# Patient Record
Sex: FEMALE | Race: WHITE | Employment: FULL TIME | URBAN - METROPOLITAN AREA
[De-identification: names, ages, dates, MRNs, and addresses within clinical notes are randomized per-mention and may not be internally consistent; named-entity substitution may affect disease eponyms.]

---

## 2017-03-19 ENCOUNTER — HOSPITAL ENCOUNTER (OUTPATIENT)
Age: 22
Discharge: HOME | End: 2017-03-19
Payer: COMMERCIAL

## 2017-03-19 VITALS
HEART RATE: 101 BPM | BODY MASS INDEX: 21.82 KG/M2 | HEIGHT: 68 IN | OXYGEN SATURATION: 100 % | RESPIRATION RATE: 12 BRPM | DIASTOLIC BLOOD PRESSURE: 85 MMHG | SYSTOLIC BLOOD PRESSURE: 114 MMHG | WEIGHT: 144 LBS | TEMPERATURE: 99 F

## 2017-03-19 DIAGNOSIS — H61.21 IMPACTED CERUMEN OF RIGHT EAR: ICD-10-CM

## 2017-03-19 DIAGNOSIS — H65.91 RIGHT NON-SUPPURATIVE OTITIS MEDIA: Primary | ICD-10-CM

## 2017-03-19 LAB — S PYO AG THROAT QL: NEGATIVE

## 2017-03-19 PROCEDURE — 99214 OFFICE O/P EST MOD 30 MIN: CPT

## 2017-03-19 PROCEDURE — 87430 STREP A AG IA: CPT

## 2017-03-19 PROCEDURE — 99213 OFFICE O/P EST LOW 20 MIN: CPT

## 2017-03-19 RX ORDER — AMOXICILLIN 875 MG/1
875 TABLET, COATED ORAL 2 TIMES DAILY
Qty: 20 TABLET | Refills: 0 | Status: SHIPPED | OUTPATIENT
Start: 2017-03-19 | End: 2017-03-29

## 2017-03-19 RX ORDER — NORGESTIMATE AND ETHINYL ESTRADIOL 0.25-0.035
KIT ORAL
COMMUNITY
End: 2017-03-19

## 2017-03-19 NOTE — ED INITIAL ASSESSMENT (HPI)
Sore throat for 6 days. Woke up this morning with severe right ear pain. No fever. Mild congestion and cough. No N/V/D. Muffled hearing from right ear.

## 2017-03-19 NOTE — ED PROVIDER NOTES
Patient presents with:  Sore Throat  Ear Pain      HPI:     Milagros Coronado is a 22year old female who presents for evaluation and management of a chief complaint of cough without respiratory distress, right ear pain, and sore throat for the past 3-4 days. adenopathy  CARDIO: RRR without murmur  EXTREMITIES: no cyanosis, edema, VELASQUEZ without difficulty  HEAD: normocephalic  EYES: sclera non icteric bilateral, conjunctiva clear  EARS: TM  left: normal and cerumen impacted  right  NOSE: nasal turbinates: swollen

## 2017-07-01 ENCOUNTER — HOSPITAL ENCOUNTER (OUTPATIENT)
Age: 22
Discharge: HOME | End: 2017-07-01
Attending: EMERGENCY MEDICINE
Payer: COMMERCIAL

## 2017-07-01 VITALS
TEMPERATURE: 99 F | OXYGEN SATURATION: 100 % | HEIGHT: 68 IN | WEIGHT: 145 LBS | SYSTOLIC BLOOD PRESSURE: 115 MMHG | HEART RATE: 102 BPM | RESPIRATION RATE: 20 BRPM | DIASTOLIC BLOOD PRESSURE: 71 MMHG | BODY MASS INDEX: 21.98 KG/M2

## 2017-07-01 DIAGNOSIS — R30.0 DYSURIA: Primary | ICD-10-CM

## 2017-07-01 LAB
B-HCG UR QL: NEGATIVE
URINE BILIRUBIN: NEGATIVE
URINE BLOOD: NEGATIVE
URINE CLARITY: CLEAR
URINE COLOR: YELLOW
URINE GLUCOSE: NEGATIVE MG/DL
URINE KETONES: NEGATIVE MG/DL
URINE LEUKOCYTE ESTERASE: NEGATIVE
URINE NITRITE: NEGATIVE
URINE PH: 7.5
URINE PROTEIN: NEGATIVE MG /DL
URINE SPECIFIC GRAVITY: 1.02
URINE UROBILINOGEN: 0.2 MG/DL

## 2017-07-01 PROCEDURE — 99212 OFFICE O/P EST SF 10 MIN: CPT

## 2017-07-01 PROCEDURE — 81025 URINE PREGNANCY TEST: CPT

## 2017-07-01 PROCEDURE — 81002 URINALYSIS NONAUTO W/O SCOPE: CPT

## 2017-07-01 NOTE — ED PROVIDER NOTES
Patient presents with:  Urinary Symptoms (urologic)      HPI:     Polo Bucio is a 22year old female who presents with a chief complaint of burning and frequency of urination. She has had rare past episodes of UTI. Onset of symptoms was today.    Patient symptoms      Orders Placed This Encounter      POC Urinalysis Dipstick Once      POCT Pregnancy, Urine Once      POCT Pregnancy, Urine    Labs performed this visit:    Recent Results (from the past 10 hour(s))  -[facility] POCT PREGNANCY URINE   Collection Time:

## 2017-07-04 ENCOUNTER — HOSPITAL ENCOUNTER (OUTPATIENT)
Age: 22
Discharge: HOME | End: 2017-07-04
Attending: EMERGENCY MEDICINE
Payer: COMMERCIAL

## 2017-07-04 VITALS
TEMPERATURE: 98 F | WEIGHT: 145 LBS | HEIGHT: 68 IN | SYSTOLIC BLOOD PRESSURE: 120 MMHG | HEART RATE: 100 BPM | RESPIRATION RATE: 20 BRPM | DIASTOLIC BLOOD PRESSURE: 70 MMHG | OXYGEN SATURATION: 98 % | BODY MASS INDEX: 21.98 KG/M2

## 2017-07-04 DIAGNOSIS — N30.01 ACUTE CYSTITIS WITH HEMATURIA: Primary | ICD-10-CM

## 2017-07-04 LAB
B-HCG UR QL: NEGATIVE
URINE BILIRUBIN: NEGATIVE
URINE GLUCOSE: NEGATIVE MG/DL
URINE KETONES: NEGATIVE MG/DL
URINE NITRITE: NEGATIVE
URINE PH: 7
URINE SPECIFIC GRAVITY: 1.02
URINE UROBILINOGEN: 0.2 MG/DL

## 2017-07-04 PROCEDURE — 99214 OFFICE O/P EST MOD 30 MIN: CPT

## 2017-07-04 PROCEDURE — 99213 OFFICE O/P EST LOW 20 MIN: CPT

## 2017-07-04 PROCEDURE — 81002 URINALYSIS NONAUTO W/O SCOPE: CPT

## 2017-07-04 PROCEDURE — 81025 URINE PREGNANCY TEST: CPT

## 2017-07-04 RX ORDER — SULFAMETHOXAZOLE AND TRIMETHOPRIM 800; 160 MG/1; MG/1
1 TABLET ORAL 2 TIMES DAILY
Qty: 14 TABLET | Refills: 0 | Status: SHIPPED | OUTPATIENT
Start: 2017-07-04 | End: 2017-07-11

## 2017-07-04 RX ORDER — SULFAMETHOXAZOLE AND TRIMETHOPRIM 800; 160 MG/1; MG/1
1 TABLET ORAL 2 TIMES DAILY
Qty: 6 TABLET | Refills: 0 | Status: SHIPPED | OUTPATIENT
Start: 2017-07-04 | End: 2017-07-04

## 2017-07-04 NOTE — ED INITIAL ASSESSMENT (HPI)
Seen here 7/1/17. Woke up this morning with dysuria and hematuria. No fever. Lower abdominal pressure. No back pain. No N/V/D.

## 2017-07-04 NOTE — ED PROVIDER NOTES
Patient Seen in: [facility]    History   Patient presents with:  Urinary Symptoms (urologic)    Stated Complaint: BLOOD IN URINE ABDOMINAL PAIN    HPI    Patient is a 73-year-old female who had a history of dysuria with a Suprapubic tenderness without guarding or rebound  Back: No CVA tenderness  Skin: No rash    ED Course     Labs Reviewed   EM POCT URINALYSIS DIPSTICK MANUAL - Abnormal; Notable for the following:        Result Value    Urine Clarity Cloudy (*)     Blood,

## 2017-08-02 ENCOUNTER — OFFICE VISIT (OUTPATIENT)
Dept: INTERNAL MEDICINE CLINIC | Facility: CLINIC | Age: 22
End: 2017-08-02

## 2017-08-02 VITALS
OXYGEN SATURATION: 99 % | TEMPERATURE: 100 F | HEART RATE: 94 BPM | BODY MASS INDEX: 21.03 KG/M2 | RESPIRATION RATE: 16 BRPM | DIASTOLIC BLOOD PRESSURE: 70 MMHG | SYSTOLIC BLOOD PRESSURE: 118 MMHG | WEIGHT: 142 LBS | HEIGHT: 68.8 IN

## 2017-08-02 DIAGNOSIS — Z00.00 PHYSICAL EXAM: Primary | ICD-10-CM

## 2017-08-02 DIAGNOSIS — Z87.440 HISTORY OF UTI: ICD-10-CM

## 2017-08-02 LAB
BILIRUBIN URINE: NEGATIVE
BLOOD URINE: NEGATIVE
CONTROL RUN WITHIN 24 HOURS?: YES
GLUCOSE URINE: NEGATIVE
KETONE URINE: NEGATIVE
LEUKOCYTE ESTERASE URINE: NEGATIVE
NITRITE URINE: NEGATIVE
PH URINE: 6.5 (ref 5–8)
PROTEIN URINE: NEGATIVE
SPEC GRAVITY: 1.01 (ref 1–1.03)
URINE CLARITY: CLEAR
URINE COLOR: YELLOW
UROBILINOGEN URINE: 0.2

## 2017-08-02 PROCEDURE — 99395 PREV VISIT EST AGE 18-39: CPT | Performed by: FAMILY MEDICINE

## 2017-08-02 NOTE — PROGRESS NOTES
HPI:   Luisito Najera is a 22year old female who presents for a complete physical exam.   Last pap:  n/a  Menses:   Regular on pills, 7 days but v light at end     Contraception:  sprintec  Sexually active: yes  Declines STD testing   Immunizations:  UTD ha Ht 68.8\"   Wt 142 lb   LMP 07/26/2017 (Exact Date)   SpO2 99%   BMI 21.09 kg/m²   Body mass index is 21.09 kg/m².    GENERAL: well developed, well nourished,in no apparent distress  SKIN: no rashes,no suspicious lesions  HEENT: atraumatic, normocephalic,th

## 2018-06-14 ENCOUNTER — TELEPHONE (OUTPATIENT)
Dept: INTERNAL MEDICINE CLINIC | Facility: CLINIC | Age: 23
End: 2018-06-14

## (undated) NOTE — ED AVS SNAPSHOT
[facility] in [address] [address] [place]    Phone:  [phone]    Fax:  [phone]           [address] [place] [address]   MRN: [id number]    Department:  [facility] in [address] [place] [address]   Date of Visit:  3/19 not participate in your health insurance plan. This may result in a lower benefit level being available to you or other limited reimbursement.   The physician may seek payment directly from you for amounts other than your deductible, co-payment, or co-insu prescription right away and begin taking the medication(s) as directed.   If you believe that any of the medications or instructions on this list is different from what your Primary Care doctor has instructed you - please continue to take your medications a - If you don’t have insurance, [facility] has partnered with [facility] to help you get signed up for insurance coverage.   [facility] is a Federal Navigator program that can help with your Affordable Care Act cover